# Patient Record
Sex: FEMALE | Race: OTHER | NOT HISPANIC OR LATINO
[De-identification: names, ages, dates, MRNs, and addresses within clinical notes are randomized per-mention and may not be internally consistent; named-entity substitution may affect disease eponyms.]

---

## 2017-02-15 ENCOUNTER — APPOINTMENT (OUTPATIENT)
Dept: SURGICAL ONCOLOGY | Facility: CLINIC | Age: 52
End: 2017-02-15

## 2017-02-15 VITALS
HEIGHT: 61 IN | SYSTOLIC BLOOD PRESSURE: 136 MMHG | WEIGHT: 157 LBS | HEART RATE: 75 BPM | DIASTOLIC BLOOD PRESSURE: 91 MMHG | BODY MASS INDEX: 29.64 KG/M2

## 2017-05-01 ENCOUNTER — RX RENEWAL (OUTPATIENT)
Age: 52
End: 2017-05-01

## 2017-05-10 ENCOUNTER — OUTPATIENT (OUTPATIENT)
Dept: OUTPATIENT SERVICES | Facility: HOSPITAL | Age: 52
LOS: 1 days | Discharge: HOME | End: 2017-05-10

## 2017-05-10 DIAGNOSIS — C50.912 MALIGNANT NEOPLASM OF UNSPECIFIED SITE OF LEFT FEMALE BREAST: ICD-10-CM

## 2017-05-12 ENCOUNTER — APPOINTMENT (OUTPATIENT)
Dept: HEMATOLOGY ONCOLOGY | Facility: CLINIC | Age: 52
End: 2017-05-12

## 2017-05-12 VITALS
WEIGHT: 156.97 LBS | DIASTOLIC BLOOD PRESSURE: 85 MMHG | RESPIRATION RATE: 16 BRPM | TEMPERATURE: 99.2 F | SYSTOLIC BLOOD PRESSURE: 120 MMHG | OXYGEN SATURATION: 95 % | BODY MASS INDEX: 29.66 KG/M2 | HEART RATE: 82 BPM

## 2017-05-12 DIAGNOSIS — N60.99 UNSPECIFIED BENIGN MAMMARY DYSPLASIA OF UNSPECIFIED BREAST: ICD-10-CM

## 2017-05-16 ENCOUNTER — RESULT REVIEW (OUTPATIENT)
Age: 52
End: 2017-05-16

## 2017-05-16 ENCOUNTER — APPOINTMENT (OUTPATIENT)
Dept: HEMATOLOGY ONCOLOGY | Facility: CLINIC | Age: 52
End: 2017-05-16

## 2017-05-16 LAB
25(OH)D3 SERPL-MCNC: 50.8 NG/ML
ALBUMIN SERPL ELPH-MCNC: 4.3 G/DL
ALP BLD-CCNC: 68 U/L
ALT SERPL-CCNC: 29 U/L
ANION GAP SERPL CALC-SCNC: 13 MMOL/L
AST SERPL-CCNC: 24 U/L
BILIRUB SERPL-MCNC: 0.4 MG/DL
BUN SERPL-MCNC: 14 MG/DL
CALCIUM SERPL-MCNC: 9 MG/DL
CHLORIDE SERPL-SCNC: 103 MMOL/L
CHOLEST SERPL-MCNC: 160 MG/DL
CHOLEST/HDLC SERPL: 4 RATIO
CO2 SERPL-SCNC: 26 MMOL/L
CREAT SERPL-MCNC: 0.62 MG/DL
GLUCOSE SERPL-MCNC: 102 MG/DL
HBA1C MFR BLD HPLC: 6 %
HCT VFR BLD CALC: 37.6 % — SIGNIFICANT CHANGE UP (ref 34.5–45)
HDLC SERPL-MCNC: 43 MG/DL
HGB BLD-MCNC: 13.2 G/DL — SIGNIFICANT CHANGE UP (ref 11.5–15.5)
LDLC SERPL CALC-MCNC: 89 MG/DL
MCHC RBC-ENTMCNC: 30.7 PG — SIGNIFICANT CHANGE UP (ref 27–34)
MCHC RBC-ENTMCNC: 35 G/DL — SIGNIFICANT CHANGE UP (ref 32–36)
MCV RBC AUTO: 87.8 FL — SIGNIFICANT CHANGE UP (ref 80–100)
PLATELET # BLD AUTO: 306 K/UL — SIGNIFICANT CHANGE UP (ref 150–400)
POTASSIUM SERPL-SCNC: 4.4 MMOL/L
PROT SERPL-MCNC: 7 G/DL
RBC # BLD: 4.29 M/UL — SIGNIFICANT CHANGE UP (ref 3.8–5.2)
RBC # FLD: 11 % — SIGNIFICANT CHANGE UP (ref 10.3–14.5)
SODIUM SERPL-SCNC: 142 MMOL/L
T3 SERPL-MCNC: 162 NG/DL
TRIGL SERPL-MCNC: 142 MG/DL
TSH SERPL-ACNC: 1.2 UIU/ML
WBC # BLD: 5.8 K/UL — SIGNIFICANT CHANGE UP (ref 3.8–10.5)
WBC # FLD AUTO: 5.8 K/UL — SIGNIFICANT CHANGE UP (ref 3.8–10.5)

## 2017-07-31 ENCOUNTER — RESULT REVIEW (OUTPATIENT)
Age: 52
End: 2017-07-31

## 2017-09-14 ENCOUNTER — APPOINTMENT (OUTPATIENT)
Dept: SURGICAL ONCOLOGY | Facility: CLINIC | Age: 52
End: 2017-09-14
Payer: COMMERCIAL

## 2017-09-14 VITALS
BODY MASS INDEX: 29.83 KG/M2 | TEMPERATURE: 98.7 F | OXYGEN SATURATION: 97 % | HEIGHT: 61 IN | SYSTOLIC BLOOD PRESSURE: 112 MMHG | HEART RATE: 80 BPM | RESPIRATION RATE: 16 BRPM | WEIGHT: 158 LBS | DIASTOLIC BLOOD PRESSURE: 80 MMHG

## 2017-09-14 PROCEDURE — 99214 OFFICE O/P EST MOD 30 MIN: CPT

## 2017-12-07 ENCOUNTER — OUTPATIENT (OUTPATIENT)
Dept: OUTPATIENT SERVICES | Facility: HOSPITAL | Age: 52
LOS: 1 days | Discharge: HOME | End: 2017-12-07

## 2017-12-07 DIAGNOSIS — C50.912 MALIGNANT NEOPLASM OF UNSPECIFIED SITE OF LEFT FEMALE BREAST: ICD-10-CM

## 2017-12-14 ENCOUNTER — APPOINTMENT (OUTPATIENT)
Dept: HEMATOLOGY ONCOLOGY | Facility: CLINIC | Age: 52
End: 2017-12-14
Payer: COMMERCIAL

## 2017-12-14 VITALS
DIASTOLIC BLOOD PRESSURE: 80 MMHG | OXYGEN SATURATION: 98 % | RESPIRATION RATE: 16 BRPM | BODY MASS INDEX: 29.58 KG/M2 | SYSTOLIC BLOOD PRESSURE: 120 MMHG | HEART RATE: 86 BPM | TEMPERATURE: 98.5 F | WEIGHT: 156.53 LBS

## 2017-12-14 PROCEDURE — 99214 OFFICE O/P EST MOD 30 MIN: CPT

## 2018-03-28 ENCOUNTER — APPOINTMENT (OUTPATIENT)
Dept: SURGICAL ONCOLOGY | Facility: CLINIC | Age: 53
End: 2018-03-28
Payer: COMMERCIAL

## 2018-03-28 VITALS
SYSTOLIC BLOOD PRESSURE: 125 MMHG | HEIGHT: 61 IN | WEIGHT: 158 LBS | HEART RATE: 82 BPM | OXYGEN SATURATION: 99 % | DIASTOLIC BLOOD PRESSURE: 80 MMHG | BODY MASS INDEX: 29.83 KG/M2

## 2018-03-28 PROCEDURE — 99214 OFFICE O/P EST MOD 30 MIN: CPT

## 2018-06-14 ENCOUNTER — RESULT REVIEW (OUTPATIENT)
Age: 53
End: 2018-06-14

## 2018-06-14 ENCOUNTER — OUTPATIENT (OUTPATIENT)
Dept: OUTPATIENT SERVICES | Facility: HOSPITAL | Age: 53
LOS: 1 days | Discharge: HOME | End: 2018-06-14

## 2018-06-14 ENCOUNTER — APPOINTMENT (OUTPATIENT)
Dept: HEMATOLOGY ONCOLOGY | Facility: CLINIC | Age: 53
End: 2018-06-14
Payer: COMMERCIAL

## 2018-06-14 VITALS
DIASTOLIC BLOOD PRESSURE: 71 MMHG | HEART RATE: 76 BPM | SYSTOLIC BLOOD PRESSURE: 110 MMHG | RESPIRATION RATE: 16 BRPM | OXYGEN SATURATION: 97 % | BODY MASS INDEX: 29.74 KG/M2 | WEIGHT: 157.41 LBS | TEMPERATURE: 98.4 F

## 2018-06-14 DIAGNOSIS — C50.912 MALIGNANT NEOPLASM OF UNSPECIFIED SITE OF LEFT FEMALE BREAST: ICD-10-CM

## 2018-06-14 LAB
HCT VFR BLD CALC: 37.5 % — SIGNIFICANT CHANGE UP (ref 34.5–45)
HGB BLD-MCNC: 12.9 G/DL — SIGNIFICANT CHANGE UP (ref 11.5–15.5)
MCHC RBC-ENTMCNC: 30.2 PG — SIGNIFICANT CHANGE UP (ref 27–34)
MCHC RBC-ENTMCNC: 34.4 G/DL — SIGNIFICANT CHANGE UP (ref 32–36)
MCV RBC AUTO: 87.6 FL — SIGNIFICANT CHANGE UP (ref 80–100)
PLATELET # BLD AUTO: 297 K/UL — SIGNIFICANT CHANGE UP (ref 150–400)
RBC # BLD: 4.28 M/UL — SIGNIFICANT CHANGE UP (ref 3.8–5.2)
RBC # FLD: 10.9 % — SIGNIFICANT CHANGE UP (ref 10.3–14.5)
WBC # BLD: 6.2 K/UL — SIGNIFICANT CHANGE UP (ref 3.8–10.5)
WBC # FLD AUTO: 6.2 K/UL — SIGNIFICANT CHANGE UP (ref 3.8–10.5)

## 2018-06-14 PROCEDURE — 99214 OFFICE O/P EST MOD 30 MIN: CPT

## 2018-06-18 LAB
25(OH)D3 SERPL-MCNC: 26.8 NG/ML
ALBUMIN SERPL ELPH-MCNC: 4.2 G/DL
ALP BLD-CCNC: 67 U/L
ALT SERPL-CCNC: 38 U/L
ANION GAP SERPL CALC-SCNC: 15 MMOL/L
AST SERPL-CCNC: 29 U/L
BILIRUB SERPL-MCNC: 0.4 MG/DL
BUN SERPL-MCNC: 14 MG/DL
CALCIUM SERPL-MCNC: 9.4 MG/DL
CHLORIDE SERPL-SCNC: 102 MMOL/L
CHOLEST SERPL-MCNC: 180 MG/DL
CHOLEST/HDLC SERPL: 4 RATIO
CO2 SERPL-SCNC: 26 MMOL/L
CREAT SERPL-MCNC: 0.69 MG/DL
ESTIMATED AVERAGE GLUCOSE: 120 MG/DL
GLUCOSE SERPL-MCNC: 105 MG/DL
HBA1C MFR BLD HPLC: 5.8 %
HDLC SERPL-MCNC: 45 MG/DL
LDLC SERPL CALC-MCNC: 106 MG/DL
POTASSIUM SERPL-SCNC: 4.3 MMOL/L
PROT SERPL-MCNC: 7.2 G/DL
SODIUM SERPL-SCNC: 143 MMOL/L
TRIGL SERPL-MCNC: 146 MG/DL
TSH SERPL-ACNC: 0.98 UIU/ML

## 2018-08-27 ENCOUNTER — APPOINTMENT (OUTPATIENT)
Dept: GASTROENTEROLOGY | Facility: CLINIC | Age: 53
End: 2018-08-27
Payer: COMMERCIAL

## 2018-08-27 VITALS
OXYGEN SATURATION: 96 % | WEIGHT: 156 LBS | HEIGHT: 61 IN | TEMPERATURE: 99 F | DIASTOLIC BLOOD PRESSURE: 80 MMHG | BODY MASS INDEX: 29.45 KG/M2 | SYSTOLIC BLOOD PRESSURE: 110 MMHG | HEART RATE: 82 BPM

## 2018-08-27 PROCEDURE — 99242 OFF/OP CONSLTJ NEW/EST SF 20: CPT

## 2018-10-04 ENCOUNTER — MEDICATION RENEWAL (OUTPATIENT)
Age: 53
End: 2018-10-04

## 2018-10-17 ENCOUNTER — APPOINTMENT (OUTPATIENT)
Dept: SURGICAL ONCOLOGY | Facility: CLINIC | Age: 53
End: 2018-10-17
Payer: COMMERCIAL

## 2018-10-17 VITALS
HEART RATE: 90 BPM | WEIGHT: 155 LBS | RESPIRATION RATE: 15 BRPM | DIASTOLIC BLOOD PRESSURE: 79 MMHG | SYSTOLIC BLOOD PRESSURE: 114 MMHG | HEIGHT: 63 IN | BODY MASS INDEX: 27.46 KG/M2

## 2018-10-17 PROCEDURE — 99214 OFFICE O/P EST MOD 30 MIN: CPT

## 2018-12-05 ENCOUNTER — RESULT REVIEW (OUTPATIENT)
Age: 53
End: 2018-12-05

## 2018-12-10 ENCOUNTER — OUTPATIENT (OUTPATIENT)
Dept: OUTPATIENT SERVICES | Facility: HOSPITAL | Age: 53
LOS: 1 days | Discharge: HOME | End: 2018-12-10

## 2018-12-10 DIAGNOSIS — C50.912 MALIGNANT NEOPLASM OF UNSPECIFIED SITE OF LEFT FEMALE BREAST: ICD-10-CM

## 2018-12-12 NOTE — OB HISTORY
[Definite:  ___ (Date)] : the last menstrual period was [unfilled] [Menarche Age: ____] : age at menarche was [unfilled] [___] :  Induced: [unfilled]

## 2018-12-14 ENCOUNTER — APPOINTMENT (OUTPATIENT)
Dept: HEMATOLOGY ONCOLOGY | Facility: CLINIC | Age: 53
End: 2018-12-14
Payer: COMMERCIAL

## 2018-12-14 VITALS
TEMPERATURE: 98.7 F | DIASTOLIC BLOOD PRESSURE: 84 MMHG | SYSTOLIC BLOOD PRESSURE: 132 MMHG | OXYGEN SATURATION: 97 % | RESPIRATION RATE: 16 BRPM | WEIGHT: 155.42 LBS | BODY MASS INDEX: 27.53 KG/M2 | HEART RATE: 90 BPM

## 2018-12-14 PROCEDURE — 99214 OFFICE O/P EST MOD 30 MIN: CPT

## 2018-12-14 NOTE — HISTORY OF PRESENT ILLNESS
[Disease: _____________________] : Disease: [unfilled] [T: ___] : T[unfilled] [N: ___] : N[unfilled] [M: ___] : M[unfilled] [AJCC Stage: ____] : AJCC Stage: [unfilled] [de-identified] : Left breast cancer and right DCIS at 47\par s/p bilateral lumpectomies and left SLN 10/24/12\par s/p radiation\par Tamoxifen started 7/13 [de-identified] : Left 4 mm IDC SBR 4/9, ER/NV >90%, HER-2 negative, 4 SLN negative, right DCIS [de-identified] : Cassie continues to do well on Tamoxifen, which she has been on since 7/13.\par Hot flashes are intermittent and occur more at night but are manageable. She has only occasional leg cramps if she has exercised more.\par Vaginal dryness persists and she use lubricants as needed.  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  For now she is still very mobile.\par LMP 12/17 and before that in 2/17.\par \par Routine Health Maintenance:\par Mammogram and breast ultrasound: 9/18 showed left breast findings for which they ordered follow up views and then 6 month f/u advised\par Pap Smear 12/05/18 normal\par Bone density: N/A on tamoxifen; to be done after menopause\par Colonoscopy: not done yet; has appt with Dr. Simpson for study in 1/19\par Genetic testing. The patient understands the rationale for testing as maternal aunt had ovarian cancer and her mother had breast cancer, but she is not interested.  She does not have children and she is an only child.  She feels that if she had a mutation she would not change her clinical management and she would just be burdened with this information. She understands that if she changes her mind she can discuss this with me at any time.\par

## 2019-02-01 ENCOUNTER — TRANSCRIPTION ENCOUNTER (OUTPATIENT)
Age: 54
End: 2019-02-01

## 2019-02-12 ENCOUNTER — APPOINTMENT (OUTPATIENT)
Dept: GASTROENTEROLOGY | Facility: AMBULATORY MEDICAL SERVICES | Age: 54
End: 2019-02-12
Payer: COMMERCIAL

## 2019-02-12 PROCEDURE — 45378 DIAGNOSTIC COLONOSCOPY: CPT

## 2019-03-02 ENCOUNTER — TRANSCRIPTION ENCOUNTER (OUTPATIENT)
Age: 54
End: 2019-03-02

## 2019-05-08 ENCOUNTER — APPOINTMENT (OUTPATIENT)
Dept: SURGICAL ONCOLOGY | Facility: CLINIC | Age: 54
End: 2019-05-08
Payer: COMMERCIAL

## 2019-05-08 VITALS
BODY MASS INDEX: 27.46 KG/M2 | SYSTOLIC BLOOD PRESSURE: 108 MMHG | WEIGHT: 155 LBS | RESPIRATION RATE: 12 BRPM | HEIGHT: 63 IN | DIASTOLIC BLOOD PRESSURE: 75 MMHG | HEART RATE: 70 BPM

## 2019-05-08 PROCEDURE — 99214 OFFICE O/P EST MOD 30 MIN: CPT

## 2019-05-08 NOTE — HISTORY OF PRESENT ILLNESS
[de-identified] : Cassie is a 53 year-old female who presents for her breast cancer follow up.  \par \par She underwent bilateral lumpectomies in November 2012.  Pathology of the right breast was DCIS, and the left breast was a 0.4 cm IDC (T1aN0, ER+/HI+/HER2-, ONCOTYPe DX= 10).  She completed adjuvant RT is is currently on Tamoxifen under the care of Dr. Hills.\par \par She underwent a b/l mammo/sono Sept 2018 and was noted with a cluster of calcifications in the Left breast 1:00 (Birads 0).    She underwent a Left mammo April 2019 and was noted with probably benign calcifications in the Left upper outer breast for which 6 month follow up imaging was recommended (Birads 3).\par \par Today she is without any complaints. Denies palpable breast masses, nipple discharge, skin changes, inversion or breast pain. Denies constitutional symptoms.

## 2019-05-08 NOTE — PHYSICAL EXAM
[Normal] : supple, no neck mass and thyroid not enlarged [Normal Supraclavicular Lymph Nodes] : normal supraclavicular lymph nodes [Normal Axillary Lymph Nodes] : normal axillary lymph nodes [Normal] : oriented to person, place and time, with appropriate affect [de-identified] : Well-healed bilateral lumpectomy scars with no evidence of recurrent disease. Fibrocystic breasts without dominant masses.  [FreeTextEntry1] : Deferred

## 2019-05-08 NOTE — ASSESSMENT
[FreeTextEntry1] : IMP:\par WIL - hx of IDC and DCIS\par On Tamoxifen 20 mg daily \par Stable Left breast calcifications\par \par Plan:\par RTO in 6 months \par Mammo/sono Sept 2019\par Bloodwork by Reinier ( change to aromitase inhibitor by Randy Gee )\par

## 2019-06-17 ENCOUNTER — OUTPATIENT (OUTPATIENT)
Dept: OUTPATIENT SERVICES | Facility: HOSPITAL | Age: 54
LOS: 1 days | Discharge: HOME | End: 2019-06-17

## 2019-06-17 DIAGNOSIS — C50.912 MALIGNANT NEOPLASM OF UNSPECIFIED SITE OF LEFT FEMALE BREAST: ICD-10-CM

## 2019-06-20 NOTE — OB HISTORY
[Menarche Age: ____] : age at menarche was [unfilled] [Definite:  ___ (Date)] : the last menstrual period was [unfilled] [___] :  Induced: [unfilled]

## 2019-06-21 ENCOUNTER — RESULT REVIEW (OUTPATIENT)
Age: 54
End: 2019-06-21

## 2019-06-21 ENCOUNTER — APPOINTMENT (OUTPATIENT)
Dept: HEMATOLOGY ONCOLOGY | Facility: CLINIC | Age: 54
End: 2019-06-21
Payer: COMMERCIAL

## 2019-06-21 VITALS
WEIGHT: 157.63 LBS | OXYGEN SATURATION: 96 % | TEMPERATURE: 98.2 F | HEART RATE: 83 BPM | BODY MASS INDEX: 27.92 KG/M2 | RESPIRATION RATE: 16 BRPM | SYSTOLIC BLOOD PRESSURE: 101 MMHG | DIASTOLIC BLOOD PRESSURE: 69 MMHG

## 2019-06-21 DIAGNOSIS — Z08 ENCOUNTER FOR FOLLOW-UP EXAMINATION AFTER COMPLETED TREATMENT FOR MALIGNANT NEOPLASM: ICD-10-CM

## 2019-06-21 DIAGNOSIS — Z85.3 ENCOUNTER FOR FOLLOW-UP EXAMINATION AFTER COMPLETED TREATMENT FOR MALIGNANT NEOPLASM: ICD-10-CM

## 2019-06-21 LAB
25(OH)D3 SERPL-MCNC: 62.2 NG/ML
ALBUMIN SERPL ELPH-MCNC: 4.5 G/DL
ALP BLD-CCNC: 70 U/L
ALT SERPL-CCNC: 35 U/L
ANION GAP SERPL CALC-SCNC: 14 MMOL/L
AST SERPL-CCNC: 26 U/L
BILIRUB SERPL-MCNC: 0.4 MG/DL
BUN SERPL-MCNC: 16 MG/DL
CALCIUM SERPL-MCNC: 9.7 MG/DL
CHLORIDE SERPL-SCNC: 101 MMOL/L
CO2 SERPL-SCNC: 26 MMOL/L
CREAT SERPL-MCNC: 0.63 MG/DL
ESTRADIOL SERPL-MCNC: <5 PG/ML
FSH SERPL-MCNC: 13.9 IU/L
GLUCOSE SERPL-MCNC: 107 MG/DL
HCT VFR BLD CALC: 38.6 % — SIGNIFICANT CHANGE UP (ref 34.5–45)
HGB BLD-MCNC: 13.5 G/DL — SIGNIFICANT CHANGE UP (ref 11.5–15.5)
MCHC RBC-ENTMCNC: 30.7 PG — SIGNIFICANT CHANGE UP (ref 27–34)
MCHC RBC-ENTMCNC: 35 G/DL — SIGNIFICANT CHANGE UP (ref 32–36)
MCV RBC AUTO: 87.7 FL — SIGNIFICANT CHANGE UP (ref 80–100)
PLATELET # BLD AUTO: 299 K/UL — SIGNIFICANT CHANGE UP (ref 150–400)
POTASSIUM SERPL-SCNC: 4.2 MMOL/L
PROT SERPL-MCNC: 7.2 G/DL
RBC # BLD: 4.4 M/UL — SIGNIFICANT CHANGE UP (ref 3.8–5.2)
RBC # FLD: 11.3 % — SIGNIFICANT CHANGE UP (ref 10.3–14.5)
SODIUM SERPL-SCNC: 141 MMOL/L
WBC # BLD: 7.5 K/UL — SIGNIFICANT CHANGE UP (ref 3.8–10.5)
WBC # FLD AUTO: 7.5 K/UL — SIGNIFICANT CHANGE UP (ref 3.8–10.5)

## 2019-06-21 PROCEDURE — 99214 OFFICE O/P EST MOD 30 MIN: CPT

## 2019-06-21 RX ORDER — SODIUM SULFATE, POTASSIUM SULFATE, MAGNESIUM SULFATE 17.5; 3.13; 1.6 G/ML; G/ML; G/ML
17.5-3.13-1.6 SOLUTION, CONCENTRATE ORAL
Qty: 1 | Refills: 0 | Status: DISCONTINUED | COMMUNITY
Start: 2018-08-27 | End: 2019-06-21

## 2019-06-21 NOTE — HISTORY OF PRESENT ILLNESS
[Disease: _____________________] : Disease: [unfilled] [T: ___] : T[unfilled] [N: ___] : N[unfilled] [M: ___] : M[unfilled] [AJCC Stage: ____] : AJCC Stage: [unfilled] [de-identified] : Cassie continues to do well on Tamoxifen, which she has been on since 7/13. She gets occasional hot flashes early in the morning. Recently she has been having more leg cramps, especially if she does not drink enough.\par Vaginal dryness persists and she use lubricants as needed.  Discussed moisturizers as well.\par she has recently developed right wrist tendonitis.\par LMP 12/17 and before that in 2/17.\par \par Routine Health Maintenance:\par Mammogram and breast ultrasound: 9/18 showed left breast findings for which they ordered follow up views and then 6 month f/u advised\par                                                           4/19 Right mammogram stable calcifications\par Pap Smear 12/05/18 normal\par Bone density: N/A on tamoxifen; to be done after menopause\par Colonoscopy: 1/19 no polyps\par Genetic testing. The patient understands the rationale for testing as maternal aunt had ovarian cancer and her mother had breast cancer, but she is not interested.  She does not have children and she is an only child.  She feels that if she had a mutation she would not change her clinical management and she would just be burdened with this information. She understands that if she changes her mind she can discuss this with me at any time.\par  [de-identified] : Left breast cancer and right DCIS at 47\par s/p bilateral lumpectomies and left SLN 10/24/12\par s/p radiation\par Tamoxifen started 7/13 [de-identified] : Left 4 mm IDC SBR 4/9, ER/IL >90%, HER-2 negative, 4 SLN negative, right DCIS

## 2019-11-06 ENCOUNTER — APPOINTMENT (OUTPATIENT)
Dept: SURGICAL ONCOLOGY | Facility: CLINIC | Age: 54
End: 2019-11-06
Payer: COMMERCIAL

## 2019-11-06 VITALS
WEIGHT: 157 LBS | OXYGEN SATURATION: 97 % | HEIGHT: 63 IN | HEART RATE: 78 BPM | RESPIRATION RATE: 17 BRPM | DIASTOLIC BLOOD PRESSURE: 83 MMHG | SYSTOLIC BLOOD PRESSURE: 128 MMHG | BODY MASS INDEX: 27.82 KG/M2 | TEMPERATURE: 98.1 F

## 2019-11-06 PROCEDURE — 99214 OFFICE O/P EST MOD 30 MIN: CPT

## 2019-11-06 NOTE — HISTORY OF PRESENT ILLNESS
[de-identified] : Cassie is a 54 year-old female who presents for her breast cancer follow up.  \par \par She underwent bilateral lumpectomies in November 2012.  Pathology of the right breast was DCIS, and the left breast was a 0.4 cm IDC (T1aN0, ER+/IL+/HER2-, ONCOTYPe DX= 10).  She completed adjuvant RT is is currently on Tamoxifen under the care of Dr. Hills.\par \par She underwent a b/l mammo/sono Sept 2019 and was noted with stable probably benign calcifications in the left breast unchanged since Oct 2018.  A 6 month follow up mammogram is recommended (Birads 3).\par \par Today she denies palpable breast masses, nipple discharge, skin changes or breast pain.  She was informed by the radiology tech that she has an inverted left nipple however she personally does not recall if this is new.

## 2019-11-06 NOTE — CONSULT LETTER
[Dear  ___] : Dear  [unfilled], [Courtesy Letter:] : I had the pleasure of seeing your patient, [unfilled], in my office today. [Please see my note below.] : Please see my note below. [Consult Closing:] : Thank you very much for allowing me to participate in the care of this patient.  If you have any questions, please do not hesitate to contact me. [Sincerely,] : Sincerely, [FreeTextEntry1] : I will keep you informed of my follow-up. [FreeTextEntry3] : Roverto Wood MD FACS\par Chief of Surgical Oncology\par \par  [DrJacinto  ___] : Dr. CAMPOS

## 2019-11-06 NOTE — PHYSICAL EXAM
[Normal] : supple, no neck mass and thyroid not enlarged [Normal Neck Lymph Nodes] : normal neck lymph nodes  [Normal Supraclavicular Lymph Nodes] : normal supraclavicular lymph nodes [Normal Axillary Lymph Nodes] : normal axillary lymph nodes [Normal] : oriented to person, place and time, with appropriate affect [de-identified] : Well-healed bilateral lumpectomy scars. Fibrocystic breasts without dominant masses.  Left breast nipple inversion but can be everted. [FreeTextEntry1] : Deferred

## 2019-11-06 NOTE — ASSESSMENT
[FreeTextEntry1] : IMP:\par WIL - hx of IDC and DCIS\par On Tamoxifen 20 mg daily \par Stable Left breast calcifications (Birads 3)\par \par Plan:\par Left mammogram March 2020 ( follow-up of calcifications 0\par RTO in 6 months \par Bloodwork by Reinier (eventual change to aromitase inhibitor by Dr. Padilla )\par

## 2020-01-07 ENCOUNTER — OUTPATIENT (OUTPATIENT)
Dept: OUTPATIENT SERVICES | Facility: HOSPITAL | Age: 55
LOS: 1 days | Discharge: HOME | End: 2020-01-07

## 2020-01-07 DIAGNOSIS — C50.912 MALIGNANT NEOPLASM OF UNSPECIFIED SITE OF LEFT FEMALE BREAST: ICD-10-CM

## 2020-01-09 ENCOUNTER — APPOINTMENT (OUTPATIENT)
Dept: HEMATOLOGY ONCOLOGY | Facility: CLINIC | Age: 55
End: 2020-01-09
Payer: COMMERCIAL

## 2020-01-09 VITALS
HEART RATE: 80 BPM | TEMPERATURE: 98.2 F | SYSTOLIC BLOOD PRESSURE: 112 MMHG | WEIGHT: 158.73 LBS | OXYGEN SATURATION: 98 % | BODY MASS INDEX: 28.12 KG/M2 | DIASTOLIC BLOOD PRESSURE: 77 MMHG | RESPIRATION RATE: 17 BRPM

## 2020-01-09 PROCEDURE — 99214 OFFICE O/P EST MOD 30 MIN: CPT

## 2020-01-15 NOTE — HISTORY OF PRESENT ILLNESS
[N: ___] : N[unfilled] [T: ___] : T[unfilled] [Disease: _____________________] : Disease: [unfilled] [M: ___] : M[unfilled] [AJCC Stage: ____] : AJCC Stage: [unfilled] [de-identified] : Left breast cancer and right DCIS at 47\par s/p bilateral lumpectomies and left SLN 10/24/12\par s/p radiation\par Tamoxifen started 7/13 [de-identified] : Left 4 mm IDC SBR 4/9, ER/MD >90%, HER-2 negative, 4 SLN negative, right DCIS [de-identified] : Cassie continues to do well on Tamoxifen, which she has been on since 7/13. \par She gets occasional hot flashes early in the morning. Recently she has been having more leg cramps, especially if she does not drink enough.\par Vaginal dryness persists and she use lubricants as needed.  Discussed moisturizers as well.\par LMP 12/17 and before that in 2/17.\par \par Routine Health Maintenance:\par Mammogram and breast ultrasound: 9/27/19 overall stable probably benign calcifications in the left breast, unchanged since 10/5/18. Six month follow up of the left breast.\par Pap Smear 12/05/18 normal\par Bone density: N/A on tamoxifen; to be done after menopause\par Colonoscopy: 1/19 no polyps\par Genetic testing. The patient understands the rationale for testing as maternal aunt had ovarian cancer and her mother had breast cancer, but she is not interested.  She does not have children and she is an only child.  She feels that if she had a mutation she would not change her clinical management and she would just be burdened with this information. She understands that if she changes her mind she can discuss this with me at any time.\par

## 2020-06-09 ENCOUNTER — RESULT REVIEW (OUTPATIENT)
Age: 55
End: 2020-06-09

## 2020-06-18 ENCOUNTER — APPOINTMENT (OUTPATIENT)
Dept: SURGICAL ONCOLOGY | Facility: CLINIC | Age: 55
End: 2020-06-18
Payer: COMMERCIAL

## 2020-06-18 VITALS — TEMPERATURE: 98 F

## 2020-06-18 VITALS
DIASTOLIC BLOOD PRESSURE: 84 MMHG | HEART RATE: 88 BPM | HEIGHT: 63 IN | OXYGEN SATURATION: 99 % | SYSTOLIC BLOOD PRESSURE: 119 MMHG | WEIGHT: 158 LBS | BODY MASS INDEX: 28 KG/M2

## 2020-06-18 PROCEDURE — 99214 OFFICE O/P EST MOD 30 MIN: CPT

## 2020-06-18 NOTE — CONSULT LETTER
[Dear  ___] : Dear  [unfilled], [Please see my note below.] : Please see my note below. [Consult Closing:] : Thank you very much for allowing me to participate in the care of this patient.  If you have any questions, please do not hesitate to contact me. [Courtesy Letter:] : I had the pleasure of seeing your patient, [unfilled], in my office today. [Sincerely,] : Sincerely, [FreeTextEntry3] : Roverto oWod MD FACS\par Chief of Surgical Oncology\par \par  [FreeTextEntry1] : I will keep you informed of my follow-up.

## 2020-06-18 NOTE — RESULTS/DATA
[FreeTextEntry1] : Patient Name:  CHARLEY TONEY	Patient ID:  [id number]\par Patient :   1965	Gender:   Female\par Accession Number:   [id number]	Study Date:   2020 08:50\par Referring Phys.:  CAMERON DILLARD	Performing Location:   [facility]\par EXAM:  MAMMO DIGITAL DIAGNOSTIC LEFT\par  \par IMPRESSION:\par \par There is no mammographic evidence of malignancy in the left breast. The probably benign calcifications in the upper outer quadrant have been stable over the past 20 months.\par \par A 4 month follow-up of both breast(s) is recommended. A letter will be sent to the patient to return for follow up.\par \par The findings and recommendations were provided to the patient.\par \par BI-RADS 3: PROBABLY BENIGN\par \par MAMMO TOMOSYNTHESIS DIAGNOSTIC LEFT\par \par Clinical Breast Exam: Patient does report clinical breast exam in the last year.\par \par Clinical Indication: Patient having a short term follow up of left breast for probably benign calcifications which have been followed since 10/5/2018 and stable through 2019. Patient has a personal history of left breast cancer status post lumpectomy and radiation in . Family history of mother with breast cancer.\par \par Compared to: 2019, 2019, 10/05/2018, 2018, 09/15/2017, 2016, and 2015\par \par Digital 2D imaging of the left breast was performed, including follow-up magnification views. Current study was also evaluated with a computer aided detection (CAD) system.\par \par Breast composition: Heterogeneously dense, which may obscure small masses.\par \par The loose grouping of calcifications in the left upper outer quadrant which includes a 2 similar focal groups measuring 2 mm each is unchanged. No new findings are identified. There are postsurgical changes at the 11:00 position and in the axilla, a biopsy clip at the 11:00 position from an MRI guided core biopsy on 10/5/2012, and coarse benign calcifications in the upper posterior breast and at the 7:00 position.\par \par Electronic Signature: I personally reviewed the images and agree with this report. Final Report: Dictated by and Signed by Attending Ramona Parks MD 2020 9:35 AM

## 2020-06-18 NOTE — HISTORY OF PRESENT ILLNESS
[de-identified] : Cassie is a 54 year-old female who presents for her breast cancer follow up. \par \par She underwent bilateral lumpectomies in November 2012. Pathology of the right breast was DCIS, and the left breast was a 0.4 cm IDC (T1aN0, ER+/IL+/HER2-, ONCOTYPe DX= 10). She completed adjuvant RT is is currently on Tamoxifen under the care of Dr. Hills.\par \par She underwent a b/l mammo/sono Sept 2019 and was noted with stable probably benign calcifications in the left breast unchanged since Oct 2018. A 6 month follow up mammogram is recommended (Birads 3). She had a Left breast Mammogram on 06/02/2020.  Deemed BIRADS -3. There was no mammographic evidence of malignancy in the left breast. The probably benign calcifications in the upper outer quadrant have been stable over the past 20 months.\par \par

## 2020-06-18 NOTE — ASSESSMENT
[FreeTextEntry1] : IMP:\par Left breast cancer -WIL on tamoxifen 20 mg daily\par Stable left breast calcifications\par \par Plan:\par RTO 10/20 with annual bilateral mammogram\par Bloodwork by Dr. Hills\par

## 2020-06-18 NOTE — PHYSICAL EXAM
[Normal] : supple, no neck mass and thyroid not enlarged [Normal Neck Lymph Nodes] : normal neck lymph nodes  [Normal Supraclavicular Lymph Nodes] : normal supraclavicular lymph nodes [Normal Axillary Lymph Nodes] : normal axillary lymph nodes [Normal] : grossly intact [de-identified] : Fibrocystic changew with radiation changes of left breast but no masses

## 2020-06-30 ENCOUNTER — OUTPATIENT (OUTPATIENT)
Dept: OUTPATIENT SERVICES | Facility: HOSPITAL | Age: 55
LOS: 1 days | Discharge: HOME | End: 2020-06-30

## 2020-06-30 DIAGNOSIS — C50.912 MALIGNANT NEOPLASM OF UNSPECIFIED SITE OF LEFT FEMALE BREAST: ICD-10-CM

## 2020-07-07 ENCOUNTER — APPOINTMENT (OUTPATIENT)
Dept: HEMATOLOGY ONCOLOGY | Facility: CLINIC | Age: 55
End: 2020-07-07
Payer: COMMERCIAL

## 2020-07-07 PROCEDURE — 99213 OFFICE O/P EST LOW 20 MIN: CPT | Mod: 95

## 2020-07-07 NOTE — PHYSICAL EXAM
[Fully active, able to carry on all pre-disease performance without restriction] : Status 0 - Fully active, able to carry on all pre-disease performance without restriction [Normal] : grossly intact [de-identified] : Normal respiratory effort. Speaking in full sentences without difficulty

## 2020-07-07 NOTE — HISTORY OF PRESENT ILLNESS
[Home] : at home, [unfilled] , at the time of the visit. [Medical Office: ([facility])___] : at the medical office located in  [Verbal consent obtained from patient] : the patient, [unfilled] [Disease: _____________________] : Disease: [unfilled] [N: ___] : N[unfilled] [T: ___] : T[unfilled] [M: ___] : M[unfilled] [AJCC Stage: ____] : AJCC Stage: [unfilled] [de-identified] : Left 4 mm IDC SBR 4/9, ER/HI >90%, HER-2 negative, 4 SLN negative, right DCIS [de-identified] : Left breast cancer and right DCIS at 47\par s/p bilateral lumpectomies and left SLN 10/24/12\par s/p radiation\par Tamoxifen started 7/13 [de-identified] : Cassie is seen for a virtual follow up visit today due to the COVID-19 pandemic.\par She started tamoxifen in 7/13 and continues to tolerate it well overall with occasional hot flashes early in the morning.\par Vaginal dryness persists and she use lubricants as needed.  Discussed moisturizers as well.\par LMP 12/17 and before that in 2/17. Labs in 6/2019 showed estradiol <5 but FSH 13.9 so not fully consistent with menopause\par \par Routine Health Maintenance:\par Mammogram and breast ultrasound: 06/02/20 showed probably benign calcification in the upper outer quadrant have been stable x 20 months.  \par Pap Smear: 6/09/20 normal\par Bone density: to be done after menopause\par Colonoscopy: 1/19 no polyps\par Genetic testing. The patient understands the rationale for testing as maternal aunt had ovarian cancer and her mother had breast cancer, but she is not interested.  She does not have children and she is an only child.  She feels that if she had a mutation she would not change her clinical management and she would just be burdened with this information. She understands that if she changes her mind she can discuss this with me at any time.\par

## 2020-10-12 ENCOUNTER — APPOINTMENT (OUTPATIENT)
Dept: SURGICAL ONCOLOGY | Facility: CLINIC | Age: 55
End: 2020-10-12
Payer: COMMERCIAL

## 2020-10-12 VITALS
WEIGHT: 158 LBS | OXYGEN SATURATION: 97 % | SYSTOLIC BLOOD PRESSURE: 120 MMHG | RESPIRATION RATE: 15 BRPM | BODY MASS INDEX: 27.99 KG/M2 | HEART RATE: 96 BPM | DIASTOLIC BLOOD PRESSURE: 82 MMHG

## 2020-10-12 PROCEDURE — 99214 OFFICE O/P EST MOD 30 MIN: CPT

## 2020-10-12 NOTE — ASSESSMENT
[FreeTextEntry1] : IMP:\par Left breast cancer -WIL on tamoxifen 20 mg daily\par \par \par Plan:\par RTO q6 months\par Annual bilateral mammogram/sonogram  10/2021\par Blood work by Dr. Hills\par

## 2020-10-12 NOTE — HISTORY OF PRESENT ILLNESS
[de-identified] : Cassie is a 55 year-old female who presents for her breast cancer follow up. \par \par She underwent bilateral lumpectomies in November 2012. Pathology of the right breast was DCIS, and the left breast was a 0.4 cm IDC (T1aN0, ER+/IA+/HER2-, ONCOTYPe DX= 10). She completed adjuvant RT is is currently on Tamoxifen under the care of Dr. Hills.  Labs in July 2020 NOT consistent with menopause, thus she will continue Tamoxifen.\par \par She underwent a Left breast Mammogram June 2020 and was noted with stable probably benign calcifications in the left breast unchanged since Oct 2018. A 4 month follow up mammogram is recommended (BI-RADS 3).\par \par She completed a bilateral mammogram and sonogram in October 2020 with stable, benign findings (BIRADS 2).\par \par

## 2020-10-12 NOTE — RESULTS/DATA
[FreeTextEntry1] : Patient Name:  CHARLEY TONEY	Patient ID:  [id number]\par Patient :   1965	Gender:   Female\par Accession Number:   [id number]	Study Date:   2020 08:50\par Referring Phys.:  CAMERON DILLARD	Performing Location:   [facility]\par EXAM:  MAMMO DIGITAL DIAGNOSTIC LEFT\par  \par IMPRESSION:\par \par There is no mammographic evidence of malignancy in the left breast. The probably benign calcifications in the upper outer quadrant have been stable over the past 20 months.\par \par A 4 month follow-up of both breast(s) is recommended. A letter will be sent to the patient to return for follow up.\par \par The findings and recommendations were provided to the patient.\par \par BI-RADS 3: PROBABLY BENIGN\par \par MAMMO TOMOSYNTHESIS DIAGNOSTIC LEFT\par \par Clinical Breast Exam: Patient does report clinical breast exam in the last year.\par \par Clinical Indication: Patient having a short term follow up of left breast for probably benign calcifications which have been followed since 10/5/2018 and stable through 2019. Patient has a personal history of left breast cancer status post lumpectomy and radiation in . Family history of mother with breast cancer.\par \par Compared to: 2019, 2019, 10/05/2018, 2018, 09/15/2017, 2016, and 2015\par \par Digital 2D imaging of the left breast was performed, including follow-up magnification views. Current study was also evaluated with a computer aided detection (CAD) system.\par \par Breast composition: Heterogeneously dense, which may obscure small masses.\par \par The loose grouping of calcifications in the left upper outer quadrant which includes a 2 similar focal groups measuring 2 mm each is unchanged. No new findings are identified. There are postsurgical changes at the 11:00 position and in the axilla, a biopsy clip at the 11:00 position from an MRI guided core biopsy on 10/5/2012, and coarse benign calcifications in the upper posterior breast and at the 7:00 position.\par \par Electronic Signature: I personally reviewed the images and agree with this report. Final Report: Dictated by and Signed by Attending Ramona Parks MD 2020 9:35 AM

## 2020-10-12 NOTE — PHYSICAL EXAM
[Normal] : supple, no neck mass and thyroid not enlarged [Normal Supraclavicular Lymph Nodes] : normal supraclavicular lymph nodes [Normal Axillary Lymph Nodes] : normal axillary lymph nodes [Normal] : oriented to person, place and time, with appropriate affect [de-identified] : Fibrocystic change with radiation changes of left breast but no masses

## 2021-01-22 ENCOUNTER — OUTPATIENT (OUTPATIENT)
Dept: OUTPATIENT SERVICES | Facility: HOSPITAL | Age: 56
LOS: 1 days | Discharge: HOME | End: 2021-01-22

## 2021-01-22 DIAGNOSIS — C50.912 MALIGNANT NEOPLASM OF UNSPECIFIED SITE OF LEFT FEMALE BREAST: ICD-10-CM

## 2021-01-26 ENCOUNTER — RESULT REVIEW (OUTPATIENT)
Age: 56
End: 2021-01-26

## 2021-01-26 ENCOUNTER — APPOINTMENT (OUTPATIENT)
Dept: HEMATOLOGY ONCOLOGY | Facility: CLINIC | Age: 56
End: 2021-01-26
Payer: COMMERCIAL

## 2021-01-26 VITALS
OXYGEN SATURATION: 98 % | TEMPERATURE: 97.9 F | HEIGHT: 62.2 IN | HEART RATE: 104 BPM | RESPIRATION RATE: 16 BRPM | SYSTOLIC BLOOD PRESSURE: 118 MMHG | BODY MASS INDEX: 28.92 KG/M2 | WEIGHT: 159.17 LBS | DIASTOLIC BLOOD PRESSURE: 82 MMHG

## 2021-01-26 LAB
BASOPHILS # BLD AUTO: 0.04 K/UL — SIGNIFICANT CHANGE UP (ref 0–0.2)
BASOPHILS NFR BLD AUTO: 0.5 % — SIGNIFICANT CHANGE UP (ref 0–2)
EOSINOPHIL # BLD AUTO: 0.15 K/UL — SIGNIFICANT CHANGE UP (ref 0–0.5)
EOSINOPHIL NFR BLD AUTO: 2 % — SIGNIFICANT CHANGE UP (ref 0–6)
HCT VFR BLD CALC: 37.8 % — SIGNIFICANT CHANGE UP (ref 34.5–45)
HGB BLD-MCNC: 13.3 G/DL — SIGNIFICANT CHANGE UP (ref 11.5–15.5)
IMM GRANULOCYTES NFR BLD AUTO: 0.1 % — SIGNIFICANT CHANGE UP (ref 0–1.5)
LYMPHOCYTES # BLD AUTO: 2.28 K/UL — SIGNIFICANT CHANGE UP (ref 1–3.3)
LYMPHOCYTES # BLD AUTO: 29.9 % — SIGNIFICANT CHANGE UP (ref 13–44)
MCHC RBC-ENTMCNC: 30.5 PG — SIGNIFICANT CHANGE UP (ref 27–34)
MCHC RBC-ENTMCNC: 35.2 G/DL — SIGNIFICANT CHANGE UP (ref 32–36)
MCV RBC AUTO: 86.7 FL — SIGNIFICANT CHANGE UP (ref 80–100)
MONOCYTES # BLD AUTO: 0.34 K/UL — SIGNIFICANT CHANGE UP (ref 0–0.9)
MONOCYTES NFR BLD AUTO: 4.5 % — SIGNIFICANT CHANGE UP (ref 2–14)
NEUTROPHILS # BLD AUTO: 4.81 K/UL — SIGNIFICANT CHANGE UP (ref 1.8–7.4)
NEUTROPHILS NFR BLD AUTO: 63 % — SIGNIFICANT CHANGE UP (ref 43–77)
NRBC # BLD: 0 /100 WBCS — SIGNIFICANT CHANGE UP (ref 0–0)
PLATELET # BLD AUTO: 300 K/UL — SIGNIFICANT CHANGE UP (ref 150–400)
RBC # BLD: 4.36 M/UL — SIGNIFICANT CHANGE UP (ref 3.8–5.2)
RBC # FLD: 11.6 % — SIGNIFICANT CHANGE UP (ref 10.3–14.5)
WBC # BLD: 7.63 K/UL — SIGNIFICANT CHANGE UP (ref 3.8–10.5)
WBC # FLD AUTO: 7.63 K/UL — SIGNIFICANT CHANGE UP (ref 3.8–10.5)

## 2021-01-26 PROCEDURE — 99072 ADDL SUPL MATRL&STAF TM PHE: CPT

## 2021-01-26 PROCEDURE — 99213 OFFICE O/P EST LOW 20 MIN: CPT

## 2021-01-27 NOTE — HISTORY OF PRESENT ILLNESS
[Disease: _____________________] : Disease: [unfilled] [T: ___] : T[unfilled] [N: ___] : N[unfilled] [M: ___] : M[unfilled] [AJCC Stage: ____] : AJCC Stage: [unfilled] [Home] : at home, [unfilled] , at the time of the visit. [Medical Office: ([facility])___] : at the medical office located in  [Verbal consent obtained from patient] : the patient, [unfilled] [de-identified] : Left breast cancer and right DCIS at 47\par s/p bilateral lumpectomies and left SLN 10/24/12\par s/p radiation\par Tamoxifen started 7/13 [de-identified] : Left 4 mm IDC SBR 4/9, ER/MA >90%, HER-2 negative, 4 SLN negative, right DCIS [de-identified] : Cassie continues to do well on Tamoxifen which  she has been on since 7/13.\par She continues to get occasional hot flashes, which tend to occur in the middle of the night, once or twice and resolve within seconds. The hot flashes have been overall very manageable. The vaginal dryness is a persistent issue, but she is managing it well with use of OTC lubricants as needed. \par LMP 12/17 and before that in 2/17. Labs in 6/2019 showed estradiol <5 but FSH 13.9 so not fully consistent with menopause\par She is overall doing well with no acute complaints at this time. She continues to work from home due to COVID, but has been noticing that working from home has some benefits and sometimes not.  She reports gaining some weight since working from home but not a significant change since her last visit in the office.\par \par Routine Health Maintenance:\par Mammogram and breast ultrasound: 10/2020 WIL\par Pap Smear: 6/09/20 normal\par Bone density: to be done after menopause\par Colonoscopy: 1/19 no polyps, next follow up in 10 years.\par Genetic testing. The patient understands the rationale for testing as maternal aunt had ovarian cancer and her mother had breast cancer, but she is not interested.  She does not have children and she is an only child.  She feels that if she had a mutation she would not change her clinical management and she would just be burdened with this information. She understands that if she changes her mind she can discuss this with me at any time.\par

## 2021-01-27 NOTE — PHYSICAL EXAM
[Fully active, able to carry on all pre-disease performance without restriction] : Status 0 - Fully active, able to carry on all pre-disease performance without restriction [Normal] : full range of motion and no deformities appreciated [de-identified] : Dense breasts bilaterally. Left nipple inverted ( not new). No masses and LN noted.

## 2021-01-28 ENCOUNTER — NON-APPOINTMENT (OUTPATIENT)
Age: 56
End: 2021-01-28

## 2021-01-28 LAB
25(OH)D3 SERPL-MCNC: 51 NG/ML
ALBUMIN SERPL ELPH-MCNC: 4.5 G/DL
ALP BLD-CCNC: 77 U/L
ALT SERPL-CCNC: 65 U/L
ANION GAP SERPL CALC-SCNC: 15 MMOL/L
AST SERPL-CCNC: 71 U/L
BILIRUB SERPL-MCNC: 0.3 MG/DL
BUN SERPL-MCNC: 14 MG/DL
CALCIUM SERPL-MCNC: 9.6 MG/DL
CHLORIDE SERPL-SCNC: 100 MMOL/L
CO2 SERPL-SCNC: 24 MMOL/L
CREAT SERPL-MCNC: 0.65 MG/DL
ESTRADIOL SERPL-MCNC: 12 PG/ML
FSH SERPL-MCNC: 14.2 IU/L
GLUCOSE SERPL-MCNC: 139 MG/DL
POTASSIUM SERPL-SCNC: 3.7 MMOL/L
PROT SERPL-MCNC: 7.5 G/DL
SODIUM SERPL-SCNC: 139 MMOL/L

## 2021-01-29 ENCOUNTER — OUTPATIENT (OUTPATIENT)
Dept: OUTPATIENT SERVICES | Facility: HOSPITAL | Age: 56
LOS: 1 days | End: 2021-01-29
Payer: COMMERCIAL

## 2021-01-29 ENCOUNTER — APPOINTMENT (OUTPATIENT)
Dept: ULTRASOUND IMAGING | Facility: HOSPITAL | Age: 56
End: 2021-01-29
Payer: COMMERCIAL

## 2021-01-29 DIAGNOSIS — C50.912 MALIGNANT NEOPLASM OF UNSPECIFIED SITE OF LEFT FEMALE BREAST: ICD-10-CM

## 2021-01-29 DIAGNOSIS — R79.89 OTHER SPECIFIED ABNORMAL FINDINGS OF BLOOD CHEMISTRY: ICD-10-CM

## 2021-01-29 PROCEDURE — 76700 US EXAM ABDOM COMPLETE: CPT

## 2021-01-29 PROCEDURE — 76700 US EXAM ABDOM COMPLETE: CPT | Mod: 26

## 2021-04-05 ENCOUNTER — APPOINTMENT (OUTPATIENT)
Dept: SURGICAL ONCOLOGY | Facility: CLINIC | Age: 56
End: 2021-04-05
Payer: COMMERCIAL

## 2021-04-05 VITALS
BODY MASS INDEX: 29.44 KG/M2 | SYSTOLIC BLOOD PRESSURE: 104 MMHG | WEIGHT: 160 LBS | HEIGHT: 62 IN | TEMPERATURE: 98.1 F | HEART RATE: 89 BPM | RESPIRATION RATE: 16 BRPM | OXYGEN SATURATION: 98 % | DIASTOLIC BLOOD PRESSURE: 73 MMHG

## 2021-04-05 PROCEDURE — 99214 OFFICE O/P EST MOD 30 MIN: CPT

## 2021-04-05 PROCEDURE — 99072 ADDL SUPL MATRL&STAF TM PHE: CPT

## 2021-04-05 NOTE — ASSESSMENT
[FreeTextEntry1] : IMP:\par Left breast cancer -WIL on tamoxifen 20 mg daily\par \par \par Plan:\par RTO q6 months\par Annual bilateral mammogram/sonogram  10/2021 (order in computer) \par Blood work by Dr. Hills\par

## 2021-04-05 NOTE — HISTORY OF PRESENT ILLNESS
[de-identified] : Cassie is a 55 year-old female who presents for her breast cancer follow up. \par \par She underwent bilateral lumpectomies in November 2012. Pathology of the right breast was DCIS, and the left breast was a 0.4 cm IDC (T1aN0, ER+/MS+/HER2-, ONCOTYPe DX= 10). She completed adjuvant RT  is currently on Tamoxifen under the care of Dr. Hills.  Labs in July 2020 NOT consistent with menopause, thus she will continue Tamoxifen until 7/2021 to complete 8 years of therapy.\par \par She underwent a Left breast Mammogram June 2020 and was noted with stable probably benign calcifications in the left breast unchanged since Oct 2018. A 4 month follow up mammogram is recommended (BI-RADS 3).\par \par She completed a bilateral mammogram and sonogram in October 2020 with stable, benign findings (BIRADS 2).\par \par She denies any palpable breast mass or nipple discharge.\par \par

## 2021-04-05 NOTE — PHYSICAL EXAM
[Normal] : supple, no neck mass and thyroid not enlarged [Normal Neck Lymph Nodes] : normal neck lymph nodes  [Normal Supraclavicular Lymph Nodes] : normal supraclavicular lymph nodes [Normal Axillary Lymph Nodes] : normal axillary lymph nodes [Normal] : oriented to person, place and time, with appropriate affect [de-identified] : fibrocystic, but no masses

## 2021-07-23 ENCOUNTER — RX RENEWAL (OUTPATIENT)
Age: 56
End: 2021-07-23

## 2021-07-28 ENCOUNTER — OUTPATIENT (OUTPATIENT)
Dept: OUTPATIENT SERVICES | Facility: HOSPITAL | Age: 56
LOS: 1 days | Discharge: HOME | End: 2021-07-28

## 2021-07-28 DIAGNOSIS — C50.912 MALIGNANT NEOPLASM OF UNSPECIFIED SITE OF LEFT FEMALE BREAST: ICD-10-CM

## 2021-07-29 ENCOUNTER — APPOINTMENT (OUTPATIENT)
Dept: HEMATOLOGY ONCOLOGY | Facility: CLINIC | Age: 56
End: 2021-07-29
Payer: COMMERCIAL

## 2021-07-29 VITALS
TEMPERATURE: 97.3 F | WEIGHT: 159.83 LBS | SYSTOLIC BLOOD PRESSURE: 120 MMHG | HEIGHT: 61 IN | BODY MASS INDEX: 30.18 KG/M2 | DIASTOLIC BLOOD PRESSURE: 83 MMHG | OXYGEN SATURATION: 97 % | RESPIRATION RATE: 16 BRPM | HEART RATE: 87 BPM

## 2021-07-29 PROCEDURE — 99213 OFFICE O/P EST LOW 20 MIN: CPT

## 2021-07-31 NOTE — HISTORY OF PRESENT ILLNESS
[Disease: _____________________] : Disease: [unfilled] [T: ___] : T[unfilled] [N: ___] : N[unfilled] [M: ___] : M[unfilled] [AJCC Stage: ____] : AJCC Stage: [unfilled] [de-identified] : Left breast cancer and right DCIS at 47\par 10/24/12 bilateral lumpectomies and left SLN 10/24/12\par s/p radiation\par 7/13 Tamoxifen started [de-identified] : Left 4 mm IDC SBR 4/9, ER/LA >90%, HER-2 negative, 4 SLN negative, right DCIS [de-identified] : Cassie started tamoxifen in 7/13 and is completing 8 years of therapy now and will be stopping the medication.\par She is doing well with no new medical issues.\par She continues to work from home due to COVID.\par She is fully vaccinated for COVID with the Pfizer vaccine.\par \par Routine Health Maintenance:\par Mammogram and breast ultrasound: 10/2020 WIL\par Pap Smear: 6/09/20 normal\par Bone density: 2019 normal\par Colonoscopy: 1/19 no polyps, next follow up in 10 years.\par Genetic testing. The patient understands the rationale for testing as maternal aunt had ovarian cancer and her mother had breast cancer, but she is not interested.  She does not have children and she is an only child.  She feels that if she had a mutation she would not change her clinical management and she would just be burdened with this information. She understands that if she changes her mind she can discuss this with me at any time.\par

## 2021-07-31 NOTE — PHYSICAL EXAM
[Fully active, able to carry on all pre-disease performance without restriction] : Status 0 - Fully active, able to carry on all pre-disease performance without restriction [Normal] : affect appropriate [de-identified] : Dense breasts bilaterally. Left nipple inverted ( not new). No masses and LN noted.

## 2021-10-04 ENCOUNTER — APPOINTMENT (OUTPATIENT)
Dept: SURGICAL ONCOLOGY | Facility: CLINIC | Age: 56
End: 2021-10-04
Payer: COMMERCIAL

## 2021-10-07 ENCOUNTER — APPOINTMENT (OUTPATIENT)
Dept: SURGICAL ONCOLOGY | Facility: CLINIC | Age: 56
End: 2021-10-07
Payer: COMMERCIAL

## 2021-10-07 VITALS
BODY MASS INDEX: 29.83 KG/M2 | RESPIRATION RATE: 16 BRPM | DIASTOLIC BLOOD PRESSURE: 77 MMHG | OXYGEN SATURATION: 97 % | SYSTOLIC BLOOD PRESSURE: 115 MMHG | WEIGHT: 158 LBS | HEART RATE: 65 BPM | HEIGHT: 61 IN

## 2021-10-07 PROCEDURE — 99214 OFFICE O/P EST MOD 30 MIN: CPT

## 2021-10-07 NOTE — HISTORY OF PRESENT ILLNESS
[de-identified] : Cassie Chaney is a 56 year-old female who presents for her breast cancer follow up. \par \par She underwent bilateral lumpectomies in November 2012. Pathology of the right breast was DCIS, and the left breast was a 0.4 cm IDC (T1aN0, ER+/WA+/HER2-, ONCOTYPe DX= 10). She completed adjuvant RT  is currently on Tamoxifen under the care of Dr. Hills.  Labs in July 2020 NOT consistent with menopause, thus she will continue Tamoxifen until 7/2021 to complete 8 years of therapy.\par \par She underwent a Left breast Mammogram June 2020 and was noted with stable probably benign calcifications in the left breast unchanged since Oct 2018. A 4 month follow up mammogram is recommended (BI-RADS 3).\par \par She completed a bilateral mammogram and sonogram in October 2020 with stable, benign findings (BIRADS 2).\par \par Bilateral Mammo/Sono October 2021: patient schedule next week  \par \par She denies any palpable breast mass or nipple discharge.

## 2021-10-07 NOTE — PHYSICAL EXAM
[Normal] : supple, no neck mass and thyroid not enlarged [Normal Neck Lymph Nodes] : normal neck lymph nodes  [Normal Supraclavicular Lymph Nodes] : normal supraclavicular lymph nodes [Normal Axillary Lymph Nodes] : normal axillary lymph nodes [Normal] : oriented to person, place and time, with appropriate affect [de-identified] : mild fibrocsytic changes but no masses

## 2021-10-07 NOTE — ASSESSMENT
[FreeTextEntry1] : IMP:\par Left breast cancer -WIL on tamoxifen 20 mg daily\par Bilateral Mammo/Sono October 2021: schedule next week \par - patient completed 8 years of tamoxifen. \par \par Plan:\par RTO yearly with breast imaging\par Annual bilateral mammogram/sonogram  now \par Blood work by Dr. Hills\par

## 2021-10-26 ENCOUNTER — APPOINTMENT (OUTPATIENT)
Dept: OBGYN | Facility: CLINIC | Age: 56
End: 2021-10-26
Payer: COMMERCIAL

## 2021-10-26 ENCOUNTER — ASOB RESULT (OUTPATIENT)
Age: 56
End: 2021-10-26

## 2021-10-26 VITALS
HEIGHT: 61 IN | BODY MASS INDEX: 29.83 KG/M2 | SYSTOLIC BLOOD PRESSURE: 130 MMHG | DIASTOLIC BLOOD PRESSURE: 80 MMHG | WEIGHT: 158 LBS

## 2021-10-26 PROCEDURE — 76830 TRANSVAGINAL US NON-OB: CPT

## 2021-10-26 PROCEDURE — 99396 PREV VISIT EST AGE 40-64: CPT

## 2021-10-26 PROCEDURE — 82270 OCCULT BLOOD FECES: CPT

## 2021-10-27 LAB — HPV HIGH+LOW RISK DNA PNL CVX: NOT DETECTED

## 2021-10-30 LAB — CYTOLOGY CVX/VAG DOC THIN PREP: NORMAL

## 2022-07-18 ENCOUNTER — OUTPATIENT (OUTPATIENT)
Dept: OUTPATIENT SERVICES | Facility: HOSPITAL | Age: 57
LOS: 1 days | Discharge: HOME | End: 2022-07-18

## 2022-07-18 DIAGNOSIS — C50.912 MALIGNANT NEOPLASM OF UNSPECIFIED SITE OF LEFT FEMALE BREAST: ICD-10-CM

## 2022-07-28 ENCOUNTER — RESULT REVIEW (OUTPATIENT)
Age: 57
End: 2022-07-28

## 2022-07-28 ENCOUNTER — APPOINTMENT (OUTPATIENT)
Dept: HEMATOLOGY ONCOLOGY | Facility: CLINIC | Age: 57
End: 2022-07-28

## 2022-07-28 VITALS
HEART RATE: 90 BPM | RESPIRATION RATE: 18 BRPM | WEIGHT: 171.96 LBS | DIASTOLIC BLOOD PRESSURE: 87 MMHG | OXYGEN SATURATION: 96 % | SYSTOLIC BLOOD PRESSURE: 126 MMHG | BODY MASS INDEX: 32.49 KG/M2 | TEMPERATURE: 97.2 F

## 2022-07-28 DIAGNOSIS — E55.9 VITAMIN D DEFICIENCY, UNSPECIFIED: ICD-10-CM

## 2022-07-28 DIAGNOSIS — R79.89 OTHER SPECIFIED ABNORMAL FINDINGS OF BLOOD CHEMISTRY: ICD-10-CM

## 2022-07-28 DIAGNOSIS — N95.1 MENOPAUSAL AND FEMALE CLIMACTERIC STATES: ICD-10-CM

## 2022-07-28 LAB
25(OH)D3 SERPL-MCNC: 60.3 NG/ML
ALBUMIN SERPL ELPH-MCNC: 4.8 G/DL
ALP BLD-CCNC: 122 U/L
ALT SERPL-CCNC: 39 U/L
ANION GAP SERPL CALC-SCNC: 15 MMOL/L
AST SERPL-CCNC: 30 U/L
BASOPHILS # BLD AUTO: 0.04 K/UL
BASOPHILS # BLD AUTO: 0.04 K/UL — SIGNIFICANT CHANGE UP (ref 0–0.2)
BASOPHILS NFR BLD AUTO: 0.5 % — SIGNIFICANT CHANGE UP (ref 0–2)
BASOPHILS NFR BLD AUTO: 0.6 %
BILIRUB SERPL-MCNC: 0.5 MG/DL
BUN SERPL-MCNC: 14 MG/DL
CALCIUM SERPL-MCNC: 10 MG/DL
CHLORIDE SERPL-SCNC: 101 MMOL/L
CO2 SERPL-SCNC: 24 MMOL/L
CREAT SERPL-MCNC: 0.66 MG/DL
EGFR: 102 ML/MIN/1.73M2
EOSINOPHIL # BLD AUTO: 0.16 K/UL
EOSINOPHIL # BLD AUTO: 0.17 K/UL — SIGNIFICANT CHANGE UP (ref 0–0.5)
EOSINOPHIL NFR BLD AUTO: 2.3 %
EOSINOPHIL NFR BLD AUTO: 2.3 % — SIGNIFICANT CHANGE UP (ref 0–6)
ESTIMATED AVERAGE GLUCOSE: 131 MG/DL
ESTRADIOL SERPL-MCNC: <5 PG/ML
FSH SERPL-MCNC: 38 IU/L
GLUCOSE SERPL-MCNC: 104 MG/DL
HBA1C MFR BLD HPLC: 6.2 %
HCT VFR BLD CALC: 38.9 %
HCT VFR BLD CALC: 39.5 % — SIGNIFICANT CHANGE UP (ref 34.5–45)
HGB BLD-MCNC: 13.3 G/DL — SIGNIFICANT CHANGE UP (ref 11.5–15.5)
HGB BLD-MCNC: 13.4 G/DL
IMM GRANULOCYTES NFR BLD AUTO: 0.3 %
IMM GRANULOCYTES NFR BLD AUTO: 0.3 % — SIGNIFICANT CHANGE UP (ref 0–1.5)
LYMPHOCYTES # BLD AUTO: 1.94 K/UL
LYMPHOCYTES # BLD AUTO: 2.16 K/UL — SIGNIFICANT CHANGE UP (ref 1–3.3)
LYMPHOCYTES # BLD AUTO: 29.1 % — SIGNIFICANT CHANGE UP (ref 13–44)
LYMPHOCYTES NFR BLD AUTO: 28.2 %
MAN DIFF?: NORMAL
MCHC RBC-ENTMCNC: 29.8 PG
MCHC RBC-ENTMCNC: 29.9 PG — SIGNIFICANT CHANGE UP (ref 27–34)
MCHC RBC-ENTMCNC: 33.7 G/DL — SIGNIFICANT CHANGE UP (ref 32–36)
MCHC RBC-ENTMCNC: 34.4 GM/DL
MCV RBC AUTO: 86.4 FL
MCV RBC AUTO: 88.8 FL — SIGNIFICANT CHANGE UP (ref 80–100)
MONOCYTES # BLD AUTO: 0.33 K/UL
MONOCYTES # BLD AUTO: 0.33 K/UL — SIGNIFICANT CHANGE UP (ref 0–0.9)
MONOCYTES NFR BLD AUTO: 4.4 % — SIGNIFICANT CHANGE UP (ref 2–14)
MONOCYTES NFR BLD AUTO: 4.8 %
NEUTROPHILS # BLD AUTO: 4.4 K/UL
NEUTROPHILS # BLD AUTO: 4.71 K/UL — SIGNIFICANT CHANGE UP (ref 1.8–7.4)
NEUTROPHILS NFR BLD AUTO: 63.4 % — SIGNIFICANT CHANGE UP (ref 43–77)
NEUTROPHILS NFR BLD AUTO: 63.8 %
NRBC # BLD: 0 /100 WBCS — SIGNIFICANT CHANGE UP (ref 0–0)
PLATELET # BLD AUTO: 277 K/UL — SIGNIFICANT CHANGE UP (ref 150–400)
PLATELET # BLD AUTO: 317 K/UL
POTASSIUM SERPL-SCNC: 4.5 MMOL/L
PROT SERPL-MCNC: 8.4 G/DL
RBC # BLD: 4.45 M/UL — SIGNIFICANT CHANGE UP (ref 3.8–5.2)
RBC # BLD: 4.5 M/UL
RBC # FLD: 11.8 % — SIGNIFICANT CHANGE UP (ref 10.3–14.5)
RBC # FLD: 11.9 %
SODIUM SERPL-SCNC: 140 MMOL/L
WBC # BLD: 7.43 K/UL — SIGNIFICANT CHANGE UP (ref 3.8–10.5)
WBC # FLD AUTO: 6.89 K/UL
WBC # FLD AUTO: 7.43 K/UL — SIGNIFICANT CHANGE UP (ref 3.8–10.5)

## 2022-07-28 PROCEDURE — 99214 OFFICE O/P EST MOD 30 MIN: CPT

## 2022-07-28 NOTE — PHYSICAL EXAM
[Fully active, able to carry on all pre-disease performance without restriction] : Status 0 - Fully active, able to carry on all pre-disease performance without restriction [Normal] : affect appropriate [de-identified] : Dense breasts bilaterally. Left nipple inverted (not new). No masses and no LN bilaterally noted.

## 2022-07-28 NOTE — HISTORY OF PRESENT ILLNESS
[Disease: _____________________] : Disease: [unfilled] [T: ___] : T[unfilled] [N: ___] : N[unfilled] [M: ___] : M[unfilled] [AJCC Stage: ____] : AJCC Stage: [unfilled] [de-identified] : Left breast cancer and right DCIS at 47\par 10/24/12 bilateral lumpectomies and left SLN 10/24/12\par s/p radiation\par 7/13 - 7/21 Tamoxifen for 8 years [de-identified] : Left 4 mm IDC SBR 4/9, ER/ME >90%, HER-2 negative, 4 SLN negative, right DCIS [de-identified] : Cassie started tamoxifen in 7/13 and completed 8 years in 7/2021. She has fewer hot flashes and leg cramps since stopping.\par She is doing well with no new medical issues.\par \par Routine Health Maintenance:\par PCP/Cardiologist: Joel Goldberg, MD\par Mammogram and breast ultrasound: 10/12/21 BI-RADS 2 \par Pap Smear: 10/26/21 negative\par Bone density: 2019 normal\par Colonoscopy: 1/19 no polyps, next follow up in 10 years.\par Genetic testing. The patient understands the rationale for testing as maternal aunt had ovarian cancer and her mother had breast cancer, but she is not interested.  She does not have children and she is an only child.  She feels that if she had a mutation she would not change her clinical management and she would just be burdened with this information. She understands that if she changes her mind she can discuss this with me at any time.\par

## 2022-10-11 DIAGNOSIS — Z12.39 ENCOUNTER FOR OTHER SCREENING FOR MALIGNANT NEOPLASM OF BREAST: ICD-10-CM

## 2022-10-20 ENCOUNTER — APPOINTMENT (OUTPATIENT)
Dept: SURGICAL ONCOLOGY | Facility: CLINIC | Age: 57
End: 2022-10-20

## 2022-10-20 PROCEDURE — 99214 OFFICE O/P EST MOD 30 MIN: CPT

## 2022-10-20 NOTE — PHYSICAL EXAM
[Normal] : supple, no neck mass and thyroid not enlarged [Normal Neck Lymph Nodes] : normal neck lymph nodes  [Normal Supraclavicular Lymph Nodes] : normal supraclavicular lymph nodes [Normal Axillary Lymph Nodes] : normal axillary lymph nodes [Normal] : oriented to person, place and time, with appropriate affect [de-identified] : mild fibrocsytic changes but no masses

## 2022-10-20 NOTE — ASSESSMENT
[FreeTextEntry1] : IMP:\par Left breast IDC, right breast DCIS s/p B/L lumpectomies 11/2012\par - patient completed 8 years of tamoxifen. \par \par B/L mammo/sono 10/2021 - BIRADS 2\par B/L mammo/sono scheduled for 11/2022 \par \par Plan:\par RTO yearly with breast imaging\par Blood work by Dr. Hills\par

## 2022-10-20 NOTE — HISTORY OF PRESENT ILLNESS
[de-identified] : Ms. CHARLEY TONEY is a 57 year old woman here today for a follow-up visit \par \par She underwent bilateral lumpectomies in November 2012. \par Pathology of the right breast was DCIS, and the left breast was a 0.4 cm IDC (T1aN0, ER+/TX+/HER2-, ONCOTYPe DX= 10). \par She completed adjuvant RT & completed Tamoxifen 7/2021 (w/ Dr. Hills)\par \par B/L mammo/sono 10/2021 - BIRADS 2\par B/L mammo/sono scheduled for 11/2022 \par \par She denies any palpable breast mass or nipple discharge.

## 2023-01-05 ENCOUNTER — APPOINTMENT (OUTPATIENT)
Dept: OBGYN | Facility: CLINIC | Age: 58
End: 2023-01-05
Payer: COMMERCIAL

## 2023-01-05 VITALS
SYSTOLIC BLOOD PRESSURE: 125 MMHG | WEIGHT: 160 LBS | BODY MASS INDEX: 30.21 KG/M2 | DIASTOLIC BLOOD PRESSURE: 85 MMHG | HEIGHT: 61 IN

## 2023-01-05 DIAGNOSIS — D05.10 INTRADUCTAL CARCINOMA IN SITU OF UNSPECIFIED BREAST: ICD-10-CM

## 2023-01-05 DIAGNOSIS — N63.20 UNSPECIFIED LUMP IN THE LEFT BREAST, UNSPECIFIED QUADRANT: ICD-10-CM

## 2023-01-05 DIAGNOSIS — Z01.419 ENCOUNTER FOR GYNECOLOGICAL EXAMINATION (GENERAL) (ROUTINE) W/OUT ABNORMAL FINDINGS: ICD-10-CM

## 2023-01-05 PROCEDURE — 99213 OFFICE O/P EST LOW 20 MIN: CPT | Mod: 25

## 2023-01-05 PROCEDURE — 99396 PREV VISIT EST AGE 40-64: CPT

## 2023-01-05 PROCEDURE — 82270 OCCULT BLOOD FECES: CPT

## 2023-01-05 RX ORDER — CLARITHROMYCIN 500 MG/1
500 TABLET, FILM COATED ORAL
Qty: 1 | Refills: 0 | Status: DISCONTINUED | COMMUNITY
Start: 2022-04-19 | End: 2023-01-05

## 2023-01-06 LAB — HPV HIGH+LOW RISK DNA PNL CVX: NOT DETECTED

## 2023-01-13 LAB — CYTOLOGY CVX/VAG DOC THIN PREP: NORMAL

## 2023-01-20 ENCOUNTER — NON-APPOINTMENT (OUTPATIENT)
Age: 58
End: 2023-01-20

## 2023-02-08 ENCOUNTER — ASOB RESULT (OUTPATIENT)
Age: 58
End: 2023-02-08

## 2023-02-08 ENCOUNTER — APPOINTMENT (OUTPATIENT)
Dept: OBGYN | Facility: CLINIC | Age: 58
End: 2023-02-08
Payer: COMMERCIAL

## 2023-02-08 PROCEDURE — 76830 TRANSVAGINAL US NON-OB: CPT

## 2023-02-09 ENCOUNTER — NON-APPOINTMENT (OUTPATIENT)
Age: 58
End: 2023-02-09

## 2023-07-19 ENCOUNTER — OUTPATIENT (OUTPATIENT)
Dept: OUTPATIENT SERVICES | Facility: HOSPITAL | Age: 58
LOS: 1 days | Discharge: HOME | End: 2023-07-19

## 2023-07-19 DIAGNOSIS — C50.912 MALIGNANT NEOPLASM OF UNSPECIFIED SITE OF LEFT FEMALE BREAST: ICD-10-CM

## 2023-07-27 ENCOUNTER — APPOINTMENT (OUTPATIENT)
Dept: HEMATOLOGY ONCOLOGY | Facility: CLINIC | Age: 58
End: 2023-07-27
Payer: COMMERCIAL

## 2023-07-27 VITALS
TEMPERATURE: 97.9 F | OXYGEN SATURATION: 97 % | DIASTOLIC BLOOD PRESSURE: 82 MMHG | WEIGHT: 169.75 LBS | HEART RATE: 85 BPM | SYSTOLIC BLOOD PRESSURE: 116 MMHG | BODY MASS INDEX: 32.07 KG/M2 | RESPIRATION RATE: 17 BRPM

## 2023-07-27 DIAGNOSIS — R92.2 INCONCLUSIVE MAMMOGRAM: ICD-10-CM

## 2023-07-27 DIAGNOSIS — R73.03 PREDIABETES.: ICD-10-CM

## 2023-07-27 PROCEDURE — 99214 OFFICE O/P EST MOD 30 MIN: CPT

## 2023-07-27 NOTE — PHYSICAL EXAM
[Fully active, able to carry on all pre-disease performance without restriction] : Status 0 - Fully active, able to carry on all pre-disease performance without restriction [Normal] : affect appropriate [de-identified] : Dense breasts bilaterally and post radiation fibrotic changes left breast. Left nipple inverted (not new). No masses and no LN bilaterally noted.

## 2023-07-27 NOTE — OB HISTORY
[Definite:  ___ (Date)] : the last menstrual period was [unfilled] [Menarche Age: ____] : age at menarche was [unfilled] [___] :  Induced: [unfilled] [Menopause Age: ____] : age at menopause was [unfilled]

## 2023-07-27 NOTE — HISTORY OF PRESENT ILLNESS
[Disease: _____________________] : Disease: [unfilled] [T: ___] : T[unfilled] [N: ___] : N[unfilled] [M: ___] : M[unfilled] [AJCC Stage: ____] : AJCC Stage: [unfilled] [de-identified] : Left breast cancer and right DCIS at 47\par 10/24/12 bilateral lumpectomies and left SLN \par s/p Radiation\par 7/13 - 7/21 Tamoxifen taken for 8 years for low risk disease [de-identified] : Left 4 mm IDC SBR 4/9, ER/NH >90%, HER-2 negative, 4 SLN negative, right DCIS [de-identified] : Cassie completed 8 years of tamoxifen in 7/2021.\par She is doing well with no new medical issues. 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 If it does not improve she will look for a new job.\par \par Routine Health Maintenance:\par PCP/Cardiologist: Dr. Simón Dunlap\par GYN: Dr. Lulú Rm\par Mammogram and breast ultrasound: Bilateral 12/8/22 BI-RADS 2; Left breast 1/19/23 done for palpable lump BI-RADS 2 \par Pap Smear: 1/5/23 negative\par Bone density: 2019 normal\par Colonoscopy: 1/19 no polyps, next follow up in 10 years.\par Genetic testing. The patient understands the rationale for testing as maternal aunt had ovarian cancer and her mother had breast cancer, but she is not interested.  She does not have children and she is an only child.  She feels that if she had a mutation she would not change her clinical management and she would just be burdened with this information. She understands that if she changes her mind she can discuss this with me at any time.\par

## 2023-11-16 ENCOUNTER — APPOINTMENT (OUTPATIENT)
Dept: SURGICAL ONCOLOGY | Facility: CLINIC | Age: 58
End: 2023-11-16
Payer: COMMERCIAL

## 2023-11-16 VITALS
WEIGHT: 169 LBS | HEART RATE: 87 BPM | BODY MASS INDEX: 31.91 KG/M2 | DIASTOLIC BLOOD PRESSURE: 87 MMHG | HEIGHT: 61 IN | RESPIRATION RATE: 17 BRPM | SYSTOLIC BLOOD PRESSURE: 130 MMHG | OXYGEN SATURATION: 99 %

## 2023-11-16 DIAGNOSIS — C50.912 MALIGNANT NEOPLASM OF UNSPECIFIED SITE OF LEFT FEMALE BREAST: ICD-10-CM

## 2023-11-16 PROCEDURE — 99214 OFFICE O/P EST MOD 30 MIN: CPT

## 2024-11-21 ENCOUNTER — APPOINTMENT (OUTPATIENT)
Dept: SURGICAL ONCOLOGY | Facility: CLINIC | Age: 59
End: 2024-11-21
Payer: COMMERCIAL

## 2024-11-21 VITALS
HEART RATE: 82 BPM | OXYGEN SATURATION: 99 % | WEIGHT: 167 LBS | DIASTOLIC BLOOD PRESSURE: 86 MMHG | BODY MASS INDEX: 31.53 KG/M2 | SYSTOLIC BLOOD PRESSURE: 122 MMHG | HEIGHT: 61 IN

## 2024-11-21 DIAGNOSIS — D05.10 INTRADUCTAL CARCINOMA IN SITU OF UNSPECIFIED BREAST: ICD-10-CM

## 2024-11-21 PROCEDURE — 99214 OFFICE O/P EST MOD 30 MIN: CPT

## 2025-01-24 ENCOUNTER — NON-APPOINTMENT (OUTPATIENT)
Age: 60
End: 2025-01-24

## 2025-03-11 ENCOUNTER — APPOINTMENT (OUTPATIENT)
Dept: OBGYN | Facility: CLINIC | Age: 60
End: 2025-03-11
Payer: COMMERCIAL

## 2025-03-11 ENCOUNTER — NON-APPOINTMENT (OUTPATIENT)
Age: 60
End: 2025-03-11

## 2025-03-11 VITALS
HEIGHT: 61 IN | WEIGHT: 165 LBS | SYSTOLIC BLOOD PRESSURE: 111 MMHG | DIASTOLIC BLOOD PRESSURE: 68 MMHG | BODY MASS INDEX: 31.15 KG/M2

## 2025-03-11 DIAGNOSIS — Z01.419 ENCOUNTER FOR GYNECOLOGICAL EXAMINATION (GENERAL) (ROUTINE) W/OUT ABNORMAL FINDINGS: ICD-10-CM

## 2025-03-11 DIAGNOSIS — Z13.31 ENCOUNTER FOR SCREENING FOR DEPRESSION: ICD-10-CM

## 2025-03-11 PROCEDURE — 82270 OCCULT BLOOD FECES: CPT

## 2025-03-11 PROCEDURE — 99459 PELVIC EXAMINATION: CPT

## 2025-03-11 PROCEDURE — 99396 PREV VISIT EST AGE 40-64: CPT

## 2025-03-11 PROCEDURE — G0444 DEPRESSION SCREEN ANNUAL: CPT | Mod: 59

## 2025-03-12 LAB — HPV HIGH+LOW RISK DNA PNL CVX: NOT DETECTED

## 2025-03-16 LAB — CYTOLOGY CVX/VAG DOC THIN PREP: ABNORMAL

## 2025-05-27 ENCOUNTER — APPOINTMENT (OUTPATIENT)
Dept: OBGYN | Facility: CLINIC | Age: 60
End: 2025-05-27
Payer: COMMERCIAL

## 2025-05-27 PROCEDURE — ZZZZZ: CPT

## 2025-05-27 PROCEDURE — 77080 DXA BONE DENSITY AXIAL: CPT

## 2025-07-01 ENCOUNTER — ASOB RESULT (OUTPATIENT)
Age: 60
End: 2025-07-01

## 2025-07-01 ENCOUNTER — APPOINTMENT (OUTPATIENT)
Dept: OBGYN | Facility: CLINIC | Age: 60
End: 2025-07-01
Payer: COMMERCIAL

## 2025-07-01 PROCEDURE — 76830 TRANSVAGINAL US NON-OB: CPT
